# Patient Record
Sex: FEMALE | Race: WHITE
[De-identification: names, ages, dates, MRNs, and addresses within clinical notes are randomized per-mention and may not be internally consistent; named-entity substitution may affect disease eponyms.]

---

## 2017-04-18 ENCOUNTER — HOSPITAL ENCOUNTER (EMERGENCY)
Dept: HOSPITAL 45 - C.EDB | Age: 42
Discharge: HOME | End: 2017-04-18
Payer: SELF-PAY

## 2017-04-18 VITALS — TEMPERATURE: 98.42 F

## 2017-04-18 VITALS
HEIGHT: 62.99 IN | HEIGHT: 62.99 IN | BODY MASS INDEX: 29.77 KG/M2 | WEIGHT: 167.99 LBS | BODY MASS INDEX: 29.77 KG/M2 | WEIGHT: 167.99 LBS

## 2017-04-18 VITALS — DIASTOLIC BLOOD PRESSURE: 67 MMHG | OXYGEN SATURATION: 98 % | HEART RATE: 65 BPM | SYSTOLIC BLOOD PRESSURE: 111 MMHG

## 2017-04-18 DIAGNOSIS — Z83.3: ICD-10-CM

## 2017-04-18 DIAGNOSIS — M62.81: ICD-10-CM

## 2017-04-18 DIAGNOSIS — Z84.1: ICD-10-CM

## 2017-04-18 DIAGNOSIS — Z87.09: ICD-10-CM

## 2017-04-18 DIAGNOSIS — M94.0: ICD-10-CM

## 2017-04-18 DIAGNOSIS — R07.2: Primary | ICD-10-CM

## 2017-04-18 DIAGNOSIS — R68.83: ICD-10-CM

## 2017-04-18 DIAGNOSIS — K58.9: ICD-10-CM

## 2017-04-18 DIAGNOSIS — Z83.79: ICD-10-CM

## 2017-04-18 DIAGNOSIS — Z82.49: ICD-10-CM

## 2017-04-18 DIAGNOSIS — K21.9: ICD-10-CM

## 2017-04-18 LAB
ALP SERPL-CCNC: 75 U/L (ref 45–117)
ALT SERPL-CCNC: 30 U/L (ref 12–78)
ANION GAP SERPL CALC-SCNC: 5 MMOL/L (ref 3–11)
AST SERPL-CCNC: 16 U/L (ref 15–37)
BASOPHILS # BLD: 0.02 K/UL (ref 0–0.2)
BASOPHILS NFR BLD: 0.3 %
BUN SERPL-MCNC: 10 MG/DL (ref 7–18)
BUN/CREAT SERPL: 13.5 (ref 10–20)
CALCIUM SERPL-MCNC: 8.2 MG/DL (ref 8.5–10.1)
CHLORIDE SERPL-SCNC: 109 MMOL/L (ref 98–107)
CKMB/CK RATIO: 1.6 (ref 0–3)
CO2 SERPL-SCNC: 28 MMOL/L (ref 21–32)
COMPLETE: YES
CREAT CL PREDICTED SERPL C-G-VRATE: 97.4 ML/MIN
CREAT SERPL-MCNC: 0.75 MG/DL (ref 0.6–1.2)
EOSINOPHIL NFR BLD AUTO: 312 K/UL (ref 130–400)
GLUCOSE SERPL-MCNC: 93 MG/DL (ref 70–99)
HCT VFR BLD CALC: 40.2 % (ref 37–47)
IG%: 0.8 %
IMM GRANULOCYTES NFR BLD AUTO: 26.3 %
LYMPHOCYTES # BLD: 1.73 K/UL (ref 1.2–3.4)
MCH RBC QN AUTO: 30.4 PG (ref 25–34)
MCHC RBC AUTO-ENTMCNC: 34.8 G/DL (ref 32–36)
MCV RBC AUTO: 87.2 FL (ref 80–100)
MONOCYTES NFR BLD: 5.3 %
NEUTROPHILS # BLD AUTO: 0.5 %
NEUTROPHILS NFR BLD AUTO: 66.8 %
PMV BLD AUTO: 9.5 FL (ref 7.4–10.4)
POINT OF CARE TROPONIN I: 0 NG/ML (ref 0–0.04)
POTASSIUM SERPL-SCNC: 3.8 MMOL/L (ref 3.5–5.1)
PREG INTERNAL NEGATIVE QC: (no result)
PREG INTERNAL POSITIVE QC: (no result)
RBC # BLD AUTO: 4.61 M/UL (ref 4.2–5.4)
SODIUM SERPL-SCNC: 142 MMOL/L (ref 136–145)
WBC # BLD AUTO: 6.58 K/UL (ref 4.8–10.8)

## 2017-04-18 NOTE — DIAGNOSTIC IMAGING REPORT
CHEST ONE VIEW PORTABLE



CLINICAL HISTORY: Atypical chest pain    



COMPARISON STUDY:  11/24/2016



FINDINGS: The cardiac and mediastinal contours are normal. There is no evidence

of focal pulmonary consolidation. There is no evidence of failure. No pleural

effusions are visualized.[ 



IMPRESSION: No active disease in the chest.







Electronically signed by:  Gato Guerra M.D.

4/18/2017 9:23 AM



Dictated Date/Time:  4/18/2017 9:23 AM

## 2017-04-18 NOTE — EMERGENCY ROOM VISIT NOTE
History


Report prepared by Juanpablo:  Lady Yepez


Under the Supervision of:  Dr. Dontrell Vaz M.D.


First contact with patient:  09:06


Chief Complaint:  CARDIAC ASSESSMENT


Stated Complaint:  PAIN OVER L BREAST,WEAK L ARM


Nursing Triage Summary:  


Left sided chest pain for 2-4 weeks. Pt states, "I thought it was a pulled 


muscle. The pain is over my left breast. It got worse last night and was 


bothering me more. I felt better if I was sitting up. I don't have any sob. I 

do 


have acid reflux, but it doesn't seem like it's that. My left arm feels weak."





History of Present Illness


The patient is a 41 year old female who presents to the Emergency Room with 

complaints of intermittent left sided chest and breast pain that began a few 

weeks ago. She does not recall anything that seems to trigger the pain. She has 

never timed the pain, but states that her pain has lasted all morning today 

without going away. She describes it as "nagging." She does not have increased 

pain with breathing. Sitting up makes the pain slightly better. Her left breast 

is not red or swollen. She does not recall any trauma or injury. Since her 

chest discomfort began, she has noticed some left arm weakness. She also 

complains of feeling chills last night. The patient states that her father has 

a history of heart issues and required stent placement. He passed away from 

what was believed to be a heart attack at 59. She does not smoke or drink 

alcohol. The patient does not have a personal history of heart or lung problems

, diabetes, or hypertension. She does note that her blood pressure was high 

compared to baseline at a doctor's appointment last week. Her doctor was not 

concerned as it was the first time she has ever had an issue. The patient 

denies chance of pregnancy. She is not on a blood thinner.





   Source of History:  patient


   Onset:  a few weeks ago


   Position:  chest (left)


   Quality:  other (nagging)


   Timing:  intermittent


   Modifying Factors (Relieving):  other (sitting up)


   Associated Symptoms:  + chills, + weakness (left arm)





Review of Systems


See HPI for pertinent positives & negatives. A total of 10 systems reviewed and 

were otherwise negative.





Past Medical & Surgical


Medical Problems:


(1) Bronchitis


(2)  delivery delivered


(3) GERD (gastroesophageal reflux disease)


(4) IBS (irritable bowel syndrome)





Old medical records were reviewed. Nurse's notes were reviewed and I agree with.





Family History





Cancer


Diabetes mellitus


Gallbladder disease


Heart disease


Hypertension


Kidney disease


Kidney stones





Social History


Smoking Status:  Never Smoker


Alcohol Use:  none


Drug Use:  none


Marital Status:  


Housing Status:  lives with family


Occupation Status:  employed





Current/Historical Medications


Miscellaneous Medications


Triamcinolone Acetonide (Nasal (Nasal Allergy 24 Hour)





Allergies


Coded Allergies:  


     Ciprofloxacin (Verified  Allergy, Unknown, UNKN, 17)


     Sulfa Antibiotics (Verified  Adverse Reaction, Intermediate, HALLUCINATIONS

, 17)





Physical Exam


Vital Signs











  Date Time  Temp Pulse Resp B/P Pulse Ox O2 Delivery O2 Flow Rate FiO2


 


17 10:51  65 14 111/67 98   


 


17 09:22  67      


 


17 09:00 36.9 69 18 129/83 98 Room Air  











Physical Exam


General: Non-ill appearing middle aged female. Well developed well nourished in 

no acute distress, breathing comfortably on room air. Normal speech


HEENT: Normal cephalic atraumatic.  Pupils are equal round and reactive to 

light.  Extraocular movements are intact.  Oropharynx is pink with moist mucous 

membranes.  No swelling of the mouth lips or tongue.


Neck: Supple with a midline trachea.  No meningeal signs or stiffness, no JVD 

or bruits. No Stridor.


Chest: Mildly reproducibly tender. Clear to auscultation bilaterally.  No 

wheezes or rhonchi.  No increased work of breathing.


Heart: regular rate and rhythm. 


Abdomen: Soft nontender, nondistended without rebound guarding or rigidity.  


Extremities: No cyanosis clubbing or edema. No calf tenderness or assymetry


Spine/Back. Non tender to palpation. No CVA tenderness


Skin: Good turgor without rashes.


Neurologic exam: Cranial nerves two through 12 are intact.  Motor and sensation 

are intact and symmetrical throughout.





Medical Decision & Procedures


ER Provider


Diagnostic Interpretation:


Radiology results as stated below per my review and radiologist interpretation:








CHEST ONE VIEW PORTABLE





CLINICAL HISTORY: Atypical chest pain    





COMPARISON STUDY:  2016





FINDINGS: The cardiac and mediastinal contours are normal. There is no evidence


of focal pulmonary consolidation. There is no evidence of failure. No pleural


effusions are visualized.[ 





IMPRESSION: No active disease in the chest.











Electronically signed by:  Gato Guerra M.D.


2017 9:23 AM





Dictated Date/Time:  2017 9:23 AM





Laboratory Results


17 09:10








Red Blood Count 4.61, Mean Corpuscular Volume 87.2, Mean Corpuscular Hemoglobin 

30.4, Mean Corpuscular Hemoglobin Concent 34.8, Mean Platelet Volume 9.5, 

Neutrophils (%) (Auto) 66.8, Lymphocytes (%) (Auto) 26.3, Monocytes (%) (Auto) 

5.3, Eosinophils (%) (Auto) 0.5, Basophils (%) (Auto) 0.3, Neutrophils # (Auto) 

4.40, Lymphocytes # (Auto) 1.73, Monocytes # (Auto) 0.35, Eosinophils # (Auto) 

0.03, Basophils # (Auto) 0.02





17 09:10

















Test


  17


09:10 17


09:13 17


09:23


 


White Blood Count


  6.58 K/uL


(4.8-10.8) 


  


 


 


Red Blood Count


  4.61 M/uL


(4.2-5.4) 


  


 


 


Hemoglobin


  14.0 g/dL


(12.0-16.0) 


  


 


 


Hematocrit 40.2 % (37-47)   


 


Mean Corpuscular Volume


  87.2 fL


() 


  


 


 


Mean Corpuscular Hemoglobin


  30.4 pg


(25-34) 


  


 


 


Mean Corpuscular Hemoglobin


Concent 34.8 g/dl


(32-36) 


  


 


 


Platelet Count


  312 K/uL


(130-400) 


  


 


 


Mean Platelet Volume


  9.5 fL


(7.4-10.4) 


  


 


 


Neutrophils (%) (Auto) 66.8 %   


 


Lymphocytes (%) (Auto) 26.3 %   


 


Monocytes (%) (Auto) 5.3 %   


 


Eosinophils (%) (Auto) 0.5 %   


 


Basophils (%) (Auto) 0.3 %   


 


Neutrophils # (Auto)


  4.40 K/uL


(1.4-6.5) 


  


 


 


Lymphocytes # (Auto)


  1.73 K/uL


(1.2-3.4) 


  


 


 


Monocytes # (Auto)


  0.35 K/uL


(0.11-0.59) 


  


 


 


Eosinophils # (Auto)


  0.03 K/uL


(0-0.5) 


  


 


 


Basophils # (Auto)


  0.02 K/uL


(0-0.2) 


  


 


 


RDW Standard Deviation


  41.2 fL


(36.4-46.3) 


  


 


 


RDW Coefficient of Variation


  12.9 %


(11.5-14.5) 


  


 


 


Immature Granulocyte % (Auto) 0.8 %   


 


Immature Granulocyte # (Auto)


  0.05 K/uL


(0.00-0.02) 


  


 


 


Anion Gap


  5.0 mmol/L


(3-11) 


  


 


 


Est Creatinine Clear Calc


Drug Dose 97.4 ml/min 


  


  


 


 


Estimated GFR (


American) 115.6 


  


  


 


 


Estimated GFR (Non-


American 99.7 


  


  


 


 


BUN/Creatinine Ratio 13.5 (10-20)   


 


Calcium Level


  8.2 mg/dl


(8.5-10.1) 


  


 


 


Total Bilirubin


  0.3 mg/dl


(0.2-1) 


  


 


 


Direct Bilirubin


  < 0.1 mg/dl


(0-0.2) 


  


 


 


Aspartate Amino Transf


(AST/SGOT) 16 U/L (15-37) 


  


  


 


 


Alanine Aminotransferase


(ALT/SGPT) 30 U/L (12-78) 


  


  


 


 


Alkaline Phosphatase


  75 U/L


() 


  


 


 


Total Creatine Kinase


  135 U/L


() 


  


 


 


Creatine Kinase MB


  2.1 ng/ml


(0.5-3.6) 


  


 


 


Total Protein


  7.5 gm/dl


(6.4-8.2) 


  


 


 


Albumin


  4.0 gm/dl


(3.4-5.0) 


  


 


 


Lipase


  146 U/L


() 


  


 


 


Human Chorionic Gonadotropin,


Qual NEG (NEG) 


  


  


 


 


Creatine Kinase MB Ratio   (0-3.0)  


 


Bedside D-Dimer


  


  


  73 ng/mlFEU


(0-450)


 


Bedside Troponin I


  


  


  0.000 ng/ml


(0-0.045)





Laboratory studies as stated above per my review.





ECG


Indication:  chest pain


Rate (beats per minute):  70


Rhythm:  normal sinus


Findings:  no acute ischemic change, no ectopy, other (no prolonged QT)


Comparison ECG Date:  16


Change:  no significant change





ED Course


0908: The patient was evaluated in room B3, and a complete history and physical 

examination were performed.





1006: Upon reevaluation, the patient is resting comfortably. I discussed the 

results and treatment plan with the patient. She verbalized agreement of the 

treatment plan. The patient was discharged home.





Medical Decision


Differentials include acute coronary syndrome, PE, costochondritis, GERD, 

electrolyte or metabolic abnormality.





This patient comes in as described above.  She was placed in room B3.  She is 

here for treatment and evaluation of chest pain.  This for month.  It is 

somewhat reproducible.  IV access established.  She had EKG which does not 

suggest acute coronary syndrome or arrhythmia.  Chest x-ray was obtained as 

well as as multiple blood testing.  She was reassessed frequently.  She 

remained stable.  Her cardiac enzymes are not elevated.  Her symptoms are very 

atypical for a cardiac event been going on for a month.  Her d-dimer is within 

normal limits and a low pretest probability study makes PE highly unlikely.  

She has no acute electrolyte or metabolic abnormalities.  She was reassessed 

frequently.  She has remained stable.  She will be discharged home.  I think 

most likely this is a costochondritis or musculoskeletal cause.  She should use 

of enteric-coated aspirin daily and return if: Increasing pain, worsening of 

symptoms, fever or chills, any new problems or concerns.  Follow-up with her 

doctor this week for recheck





Impression





 Primary Impression:  


 Precordial chest pain


 Additional Impression:  


 Costochondritis





Scribe Attestation


The scribe's documentation has been prepared under my direction and personally 

reviewed by me in its entirety. I confirm that the note above accurately 

reflects all work, treatment, procedures, and medical decision making performed 

by me.





Departure Information


Dispostion


Home / Self-Care





Referrals


[facility] (PCP)





Patient Instructions


My [facility]





Additional Instructions





Rest


Drink plenty of fluids





Use an enteric aspirin 81 mg a day





REturn if: worsening of symptoms, increasing pain, fever, shortness of breath, 

any new problems or concerns





Follow-up with your doctor in 2-4 days for recheck





Problem Qualifiers

## 2017-06-30 ENCOUNTER — HOSPITAL ENCOUNTER (OUTPATIENT)
Dept: HOSPITAL 45 - C.LAB1850 | Age: 42
Discharge: HOME | End: 2017-06-30
Attending: OBSTETRICS & GYNECOLOGY
Payer: COMMERCIAL

## 2017-06-30 DIAGNOSIS — N93.9: Primary | ICD-10-CM

## 2017-06-30 LAB
PREG INTERNAL NEGATIVE QC: (no result)
PREG INTERNAL POSITIVE QC: (no result)

## 2018-02-19 ENCOUNTER — HOSPITAL ENCOUNTER (EMERGENCY)
Dept: HOSPITAL 45 - C.EDB | Age: 43
Discharge: HOME | End: 2018-02-19
Payer: SELF-PAY

## 2018-02-19 VITALS
HEIGHT: 62.99 IN | WEIGHT: 163.14 LBS | HEIGHT: 62.99 IN | BODY MASS INDEX: 28.91 KG/M2 | BODY MASS INDEX: 28.91 KG/M2 | WEIGHT: 163.14 LBS

## 2018-02-19 VITALS — SYSTOLIC BLOOD PRESSURE: 116 MMHG | HEART RATE: 65 BPM | DIASTOLIC BLOOD PRESSURE: 73 MMHG | OXYGEN SATURATION: 100 %

## 2018-02-19 VITALS — TEMPERATURE: 98.78 F

## 2018-02-19 VITALS — OXYGEN SATURATION: 100 %

## 2018-02-19 DIAGNOSIS — Z83.3: ICD-10-CM

## 2018-02-19 DIAGNOSIS — K58.9: ICD-10-CM

## 2018-02-19 DIAGNOSIS — K21.9: ICD-10-CM

## 2018-02-19 DIAGNOSIS — R07.1: Primary | ICD-10-CM

## 2018-02-19 DIAGNOSIS — Z82.49: ICD-10-CM

## 2018-02-19 DIAGNOSIS — Z84.1: ICD-10-CM

## 2018-02-19 LAB
ALBUMIN SERPL-MCNC: 4 GM/DL (ref 3.4–5)
ALP SERPL-CCNC: 75 U/L (ref 45–117)
ALT SERPL-CCNC: 34 U/L (ref 12–78)
AST SERPL-CCNC: 22 U/L (ref 15–37)
BASOPHILS # BLD: 0.03 K/UL (ref 0–0.2)
BASOPHILS NFR BLD: 0.3 %
BUN SERPL-MCNC: 6 MG/DL (ref 7–18)
CALCIUM SERPL-MCNC: 8.4 MG/DL (ref 8.5–10.1)
CO2 SERPL-SCNC: 25 MMOL/L (ref 21–32)
CREAT SERPL-MCNC: 0.74 MG/DL (ref 0.6–1.2)
EOS ABS #: 0.01 K/UL (ref 0–0.5)
EOSINOPHIL NFR BLD AUTO: 319 K/UL (ref 130–400)
GLUCOSE SERPL-MCNC: 90 MG/DL (ref 70–99)
HCT VFR BLD CALC: 41.1 % (ref 37–47)
HGB BLD-MCNC: 13.9 G/DL (ref 12–16)
IG#: 0.03 K/UL (ref 0–0.02)
IMM GRANULOCYTES NFR BLD AUTO: 19.4 %
INR PPP: 1 (ref 0.9–1.1)
LIPASE: 129 U/L (ref 73–393)
LYMPHOCYTES # BLD: 1.68 K/UL (ref 1.2–3.4)
MCH RBC QN AUTO: 29.5 PG (ref 25–34)
MCHC RBC AUTO-ENTMCNC: 33.8 G/DL (ref 32–36)
MCV RBC AUTO: 87.3 FL (ref 80–100)
MONO ABS #: 0.46 K/UL (ref 0.11–0.59)
MONOCYTES NFR BLD: 5.3 %
NEUT ABS #: 6.44 K/UL (ref 1.4–6.5)
NEUTROPHILS # BLD AUTO: 0.1 %
NEUTROPHILS NFR BLD AUTO: 74.6 %
PMV BLD AUTO: 9.3 FL (ref 7.4–10.4)
POTASSIUM SERPL-SCNC: 3.9 MMOL/L (ref 3.5–5.1)
PROT SERPL-MCNC: 7.7 GM/DL (ref 6.4–8.2)
PTT PATIENT: 25.6 SECONDS (ref 21–31)
RED CELL DISTRIBUTION WIDTH CV: 12.6 % (ref 11.5–14.5)
RED CELL DISTRIBUTION WIDTH SD: 40.7 FL (ref 36.4–46.3)
SODIUM SERPL-SCNC: 139 MMOL/L (ref 136–145)
WBC # BLD AUTO: 8.65 K/UL (ref 4.8–10.8)

## 2018-02-19 NOTE — EMERGENCY ROOM VISIT NOTE
History


First contact with patient:  14:07


Chief Complaint:  RESPIRATORY PROBLEMS


Stated Complaint:  PAIN IN CENTER OF CHEST, HURTS WHEN TAKING DEEP BR


Nursing Triage Summary:  


pt to the ED with c/o difficulty breathing and feels tightness in chest and 


hurts more when she takes a deep breath





sx started last week was on augmentin  and was taken off it for heartburn





History of Present Illness


The patient is a 42 year old female who presents to the Emergency Room with 

complaints of intermittent chest pain for the past month.  The patient reports 

that she was seen 2 weeks ago at [facility] by Dr. Gustafson, 

and given a prescription for acute sinusitis.  The patient denied any cough at 

that time.  Within a few days of use of Augmentin, she then started to develop 

worsening chest discomfort.  Dr. Gustafson told her to stop the Augmentin and take 

Pepcid for her pain.  The patient reports persistent chest pain, especially 

when taking a deep breath and exhaling.  The patient reports that it feels like 

something heavy is sitting on her chest.  She denies any pain radiating into 

the ribs or back.  She also denies any pain radiating into the abdomen, neck or 

jaw.  The patient reports that she has had a prior history of chest pain, and 

had a normal stress test performed last year after being evaluated here in the 

emergency department for chest pain.  Her stress test and echo were both 

normal.  The patient does have a family history of coronary artery disease with 

her father dying at the age of 59 from a heart attack after undergoing cardiac 

stenting prior to death.  She denies tobacco or alcohol use.  The patient 

denies any significant cough or shortness of breath area the patient denies any 

diagnosis of GERD.  She currently rates her discomfort a 6 out of 10.





Review of Systems


HEENT:  Denies dizziness, visual problems, hearing loss, tinnitus.  Denies 

difficulty swallowing or oral lesions.


PULMONARY: Denies significant cough, shortness of breath, sputum production or 

hemoptysis.


CARDIOVASCULAR:  Denies palpitations, dyspnea on exertion, orthopnea or 

peripheral edema.  Otherwise see history of present illness.


GASTROINTESTINAL:  Denies diarrhea, constipation, nausea, vomiting, or 

abdominal pain.


GENITOURINARY:  Denies dysuria, frequency, urgency or nocturia.


NEUROLOGIC:  Denies history of epilepsy, CVA, TIA or chronic headaches.


MUSCULOSKELETAL: Denies history of joint tenderness/swelling.


SKIN:  Denies rashes or lesions.


PSYCHIATRIC: Denies history of depression or mental illness.


ENDOCRINE:  Denies history of diabetes or thyroid disorders.





Past Medical/Surgical History


Medical Problems:


(1) Bronchitis


(2)  delivery delivered


(3) GERD (gastroesophageal reflux disease)


(4) IBS (irritable bowel syndrome)








Family History





Cancer


Diabetes mellitus


Gallbladder disease


Heart disease


Hypertension


Kidney disease


Kidney stones





Social History


Smoking Status:  Never Smoker


Alcohol Use:  none


Drug Use:  none


Marital Status:  


Housing Status:  lives with family


Occupation Status:  employed





Current/Historical Medications


Scheduled


Famotidine (Pepcid), 1 TAB PO BID





Physical Exam


Vital Signs











  Date Time  Temp Pulse Resp B/P (MAP) Pulse Ox O2 Delivery O2 Flow Rate FiO2


 


18 15:40  65 18 116/73 100 Room Air  


 


18 14:08  63      


 


18 14:01     100 Room Air  


 


18 14:00     100 Room Air  


 


18 13:34 37.1 71 16 160/95 99   











Physical Exam


CONSTITUTIONAL:  Healthy and well nourished.  Alert and oriented X 3 with 

positive affect.  She does not appear in any acute distress.


HEENT:  Normocephalic, atraumatic.  Pupils equal, round and reactive.  Ears and 

nares are clear.  No scleral icterus or conjunctival injection.


OROPHARYNX: No postnasal drip or posterior pharyngeal erythema.


NECK:  Full active range of motion without discomfort.  No JVD or carotid 

bruits.


RESPIRATORY:  Clear to auscultation bilaterally with no wheezing, crackles, 

rhonchi or stridor.  She does not have any significant discomfort with 

inhalation or exhalation, although she does report worsening discomfort with 

exhalation.


CARDIOVASCULAR:  Regular rate and rhythm with no murmurs, rubs or gallops.


GASTROINTESTINAL:  Bowel sounds present in all quadrants.  Soft and nontender 

to palpation.


MUSCULOSKELETAL:  Full range of motion of all joints without discomfort.  

Should has mild tenderness to palpation over the costochondral joints.  No 

tenderness to palpation through the ribs.


INTEGUMENTARY:  No rash or other significant dermatologic conditions noted.


HEMATOLOGIC: No ecchymosis or petechiae noted.


NEUROLOGIC:  Cranial nerves II-XII grossly intact.  No focal neurologic 

deficits noted.





Medical Decision & Procedures


ER Provider


Diagnostic Interpretation:


My interpretation of an ECG shows a normal sinus rhythm of 62 bpm without ST 

elevation or other conduction abnormalities.  P waves do appear inverted on V1, 

but comparison with an ECG dated 17 does not show any acute changes.





========================================================





My interpretation of a portable chest x-ray does not show any consolidations, 

pneumothorax or obvious cardiomegaly.  Radiologist report is as follows:





SINGLE VIEW CHEST





CLINICAL HISTORY:  Dyspnea.





FINDINGS: An AP, portable, upright chest radiograph is compared to study dated


2017. The examination is degraded by portable technique, apical lordotic


positioning, and patient rotation.  The cardiomediastinal silhouette is


unremarkable. The lungs and pleural spaces are clear. No pneumothorax is seen.


The bony thorax is grossly intact.





IMPRESSION: No active disease in the chest.





Laboratory Results


18 14:00








Red Blood Count 4.71, Mean Corpuscular Volume 87.3, Mean Corpuscular Hemoglobin 

29.5, Mean Corpuscular Hemoglobin Concent 33.8, Mean Platelet Volume 9.3, 

Neutrophils (%) (Auto) 74.6, Lymphocytes (%) (Auto) 19.4, Monocytes (%) (Auto) 

5.3, Eosinophils (%) (Auto) 0.1, Basophils (%) (Auto) 0.3, Neutrophils # (Auto) 

6.44, Lymphocytes # (Auto) 1.68, Monocytes # (Auto) 0.46, Eosinophils # (Auto) 

0.01, Basophils # (Auto) 0.03





18 14:00

















Test


  18


14:00


 


White Blood Count


  8.65 K/uL


(4.8-10.8)


 


Red Blood Count


  4.71 M/uL


(4.2-5.4)


 


Hemoglobin


  13.9 g/dL


(12.0-16.0)


 


Hematocrit 41.1 % (37-47) 


 


Mean Corpuscular Volume


  87.3 fL


()


 


Mean Corpuscular Hemoglobin


  29.5 pg


(25-34)


 


Mean Corpuscular Hemoglobin


Concent 33.8 g/dl


(32-36)


 


Platelet Count


  319 K/uL


(130-400)


 


Mean Platelet Volume


  9.3 fL


(7.4-10.4)


 


Neutrophils (%) (Auto) 74.6 % 


 


Lymphocytes (%) (Auto) 19.4 % 


 


Monocytes (%) (Auto) 5.3 % 


 


Eosinophils (%) (Auto) 0.1 % 


 


Basophils (%) (Auto) 0.3 % 


 


Neutrophils # (Auto)


  6.44 K/uL


(1.4-6.5)


 


Lymphocytes # (Auto)


  1.68 K/uL


(1.2-3.4)


 


Monocytes # (Auto)


  0.46 K/uL


(0.11-0.59)


 


Eosinophils # (Auto)


  0.01 K/uL


(0-0.5)


 


Basophils # (Auto)


  0.03 K/uL


(0-0.2)


 


RDW Standard Deviation


  40.7 fL


(36.4-46.3)


 


RDW Coefficient of Variation


  12.6 %


(11.5-14.5)


 


Immature Granulocyte % (Auto) 0.3 % 


 


Immature Granulocyte # (Auto)


  0.03 K/uL


(0.00-0.02)


 


Prothrombin Time


  10.2 SECONDS


(9.0-12.0)


 


Prothromb Time International


Ratio 1.0 (0.9-1.1) 


 


 


Activated Partial


Thromboplast Time 25.6 SECONDS


(21.0-31.0)


 


Partial Thromboplastin Ratio 1.0 


 


D-Dimer


  310 ug/L FEU


(0-500)


 


Anion Gap


  9.0 mmol/L


(3-11)


 


Est Creatinine Clear Calc


Drug Dose 96.4 ml/min 


 


 


Estimated GFR (


American) 116.6 


 


 


Estimated GFR (Non-


American 100.6 


 


 


BUN/Creatinine Ratio 7.6 (10-20) 


 


Calcium Level


  8.4 mg/dl


(8.5-10.1)


 


Total Bilirubin


  0.4 mg/dl


(0.2-1)


 


Direct Bilirubin


  < 0.1 mg/dl


(0-0.2)


 


Aspartate Amino Transf


(AST/SGOT) 22 U/L (15-37) 


 


 


Alanine Aminotransferase


(ALT/SGPT) 34 U/L (12-78) 


 


 


Alkaline Phosphatase


  75 U/L


()


 


Total Creatine Kinase


  132 U/L


()


 


Troponin I


  < 0.015 ng/ml


(0-0.045)


 


Total Protein


  7.7 gm/dl


(6.4-8.2)


 


Albumin


  4.0 gm/dl


(3.4-5.0)


 


Lipase


  129 U/L


()











The above labs were reviewed.  Troponin and d-dimer are normal.  Otherwise 

remaining labs are also grossly normal.





Medications Administered











 Medications


  (Trade)  Dose


 Ordered  Sig/Elizabeth


 Route  Start Time


 Stop Time Status Last Admin


Dose Admin


 


 Ketorolac


 Tromethamine


  (Toradol Inj)  30 mg  NOW  STAT


 IV  18 15:31


 18 15:32 DC 18 15:39


30 MG











ED Course


Patient history and physical exam were performed.  Nurse's notes were reviewed.

  Vital signs were reviewed, showing a blood pressure 160/95.  O2 saturation is 

99% on room air.  The patient is not tachycardic, and is afebrile.  I also 

reviewed prior medical records, showing that the patient was here in 2017 

with chest pain and normal workup.  Although the patient reports that she had 

her stress test and echo performed at our hospital.  No records exist through 

[facility].





IV access was established, and labs were drawn.  The patient refused any 

analgesics.  An ECG and portable chest x-ray were normal.  Review of labs shows 

a normal d-dimer and troponin.  Remaining labs are also grossly normal.  The 

patient denied any worsening pain while in the emergency department.





The case was further discussed with Dr. Pitts, ED attending physician, who 

agrees that this pain is likely pleuritic in nature.  The patient was 

administered IV Toradol.  She was encouraged to alternate ibuprofen and Tylenol 

as needed for pain.  The patient reports that she has no point with her PCP 

tomorrow for reevaluation.  The patient was instructed to return to the 

emergency department for any progressively worsening pain, nausea, diaphoresis 

or other concerning symptoms.  The patient was happy with plan of care, and 

rated her discomfort a 3 out of 10 at the conclusion of my exam, which was 

prior to her IV Toradol administration.





Medical Decision


Patient presents to the emergency department with complaint of chest pain that 

is worsened with inspiration and expiration.  This is most suggestive of 

pleuritic chest pain.  Her workup today does not show any evidence for 

myocardial infarction.  Chest x-rays are not consistent with pneumonia, 

pneumothorax, heart failure or cardiomegaly.  I do not suspect pericarditis or 

myocarditis.  The patient may warrant cardiology reevaluation if symptoms 

persist.  The patient was given instructions on NSAIDs for treatment.  

Laboratory studies are not suggestive of pancreatitis, cholecystitis or 

hepatitis.  At this point, I do not suspect acute gastritis, peptic ulcer 

disease or GI bleed.





[place] Drug Monitoring Program


Search Results:  patient reviewed within database





Medication Reconcilliation


Current Medication List:  was personally reviewed by me





Blood Pressure Screening


Patient's blood pressure:  Normal blood pressure





Impression





 Primary Impression:  


 Pleuritic chest pain





Departure Information


Referrals


[facility] (PCP)





Patient Instructions


My [facility]

## 2018-02-19 NOTE — DIAGNOSTIC IMAGING REPORT
SINGLE VIEW CHEST



CLINICAL HISTORY:  Dyspnea.



FINDINGS: An AP, portable, upright chest radiograph is compared to study dated

4/18/2017. The examination is degraded by portable technique, apical lordotic

positioning, and patient rotation.  The cardiomediastinal silhouette is

unremarkable. The lungs and pleural spaces are clear. No pneumothorax is seen.

The bony thorax is grossly intact.



IMPRESSION: No active disease in the chest.







Electronically signed by:  Kofi Parish M.D.

2/19/2018 2:26 PM



Dictated Date/Time:  2/19/2018 2:26 PM